# Patient Record
Sex: FEMALE | Race: WHITE | URBAN - METROPOLITAN AREA
[De-identification: names, ages, dates, MRNs, and addresses within clinical notes are randomized per-mention and may not be internally consistent; named-entity substitution may affect disease eponyms.]

---

## 2017-02-13 ENCOUNTER — OFFICE VISIT (OUTPATIENT)
Dept: OTOLARYNGOLOGY | Facility: CLINIC | Age: 57
End: 2017-02-13

## 2017-02-13 DIAGNOSIS — H92.11 OTORRHEA, RIGHT: Primary | ICD-10-CM

## 2017-02-13 RX ORDER — OMEGA-3-ACID ETHYL ESTERS 1 G/1
2 CAPSULE, LIQUID FILLED ORAL 2 TIMES DAILY
COMMUNITY

## 2017-02-13 ASSESSMENT — PAIN SCALES - GENERAL: PAINLEVEL: NO PAIN (0)

## 2017-02-13 NOTE — LETTER
2/13/2017      RE: Cailin Hidalgo  [address]  [facility] [address]       HPI: Cailin Hidalgo returns for recheck of her ears. She has a history of right chronic otorrhea. She was last seen in mid December with signs of right otomastoiditis. Culture at that time showed klebsiella and staph aureus. She reports finishing her course of antibiotics. Ear fullness is improved, she feels that hearing on that side is about the same. She denies any recent drainage since finishing the antibiotics    Physical examination:  female in no acute distress.  Alert and answering questions appropriately.  HB 1/6 bilaterally. Bilateral ears examined under the microscope. Left TM intact, thin and flaccid posterior inferiorly, normal landmarks, no sign of effusion. Right TM intact, mild irritation superiorly, small wet area along anterior surface of TM but much thinner with no granulation tissue, no suleman purulence.  Fraire lateralizes to right, Rinne equivocal    Assessment and plan:  56 y.o. Female with chronic otorrhea. Right TM significantly improved in appearance since last visit. Wet area on right TM cultured. Pt would benefit from PE tube once TM clear of all infection. She will return for recheck in 1 week once culture results are back.    Patient examined with ENT staff, Dr. Faby Mckenna.    Claudette Pena MD  Otolaryngology- Head and Neck Surgery PGY2     The patient was seen and examined by myself.  I agree with the resident's assessment and plan.    Faby Mckenna MD

## 2017-02-13 NOTE — PROGRESS NOTES
HPI: Cailin Hidalgo returns for recheck of her ears. She has a history of right chronic otorrhea. She was last seen in mid December with signs of right otomastoiditis. Culture at that time showed klebsiella and staph aureus. She reports finishing her course of antibiotics. Ear fullness is improved, she feels that hearing on that side is about the same. She denies any recent drainage since finishing the antibiotics    Physical examination:  female in no acute distress.  Alert and answering questions appropriately.  HB 1/6 bilaterally. Bilateral ears examined under the microscope. Left TM intact, thin and flaccid posterior inferiorly, normal landmarks, no sign of effusion. Right TM intact, mild irritation superiorly, small wet area along anterior surface of TM but much thinner with no granulation tissue, no suleman purulence.  Fraire lateralizes to right, Rinne equivocal    Assessment and plan:  56 y.o. Female with chronic otorrhea. Right TM significantly improved in appearance since last visit. Wet area on right TM cultured. Pt would benefit from PE tube once TM clear of all infection. She will return for recheck in 1 week once culture results are back.    Patient examined with ENT staff, Dr. aFby Mckenna.    Claudette Pena MD  Otolaryngology- Head and Neck Surgery PGY2     I, Faby Mckenna, saw this patient with the resident/fellow and agree with the resident s findings and plan of care as documented in the resident s/fellow s note. I was present for the entire procedure.    Faby Mckenna MD

## 2017-02-13 NOTE — Clinical Note
2/13/2017       RE: Cailin Hidalgo  [address]  [facility] [address]     Dear Colleague,    Thank you for referring your patient, Cailin Hidalgo, to the [facility] EAR NOSE AND THROAT at [facility]. Please see a copy of my visit note below.    HPI: Cailin Hidalgo returns for recheck of her ears. She has a history of right chronic otorrhea. She was last seen in mid December with signs of right otomastoiditis. Culture at that time showed klebsiella and staph aureus. She reports finishing her course of antibiotics. Ear fullness is improved, she feels that hearing on that side is about the same. She denies any recent drainage since finishing the antibiotics    Review of systems:  10 point review of systems completed and negative unless otherwise stated in HPI.     Physical examination:  female in no acute distress.  Alert and answering questions appropriately.  HB 1/6 bilaterally. PERRL, EOMI. Bilateral ears examined under the microscope. Left TM intact, thin and flaccid posterior inferiorly, normal landmarks, no sign of effusion. Right TM intact, mild irritation superiorly, small wet area along anterior surface of TM. No suleman purulence.  Fraire lateralizes to right, Rinne equivocal    Assessment and plan:  56 y.o. Female with chronic otorrhea. Right TM significantly improved in appearance since last visit. Wet area on right TM cultured. Pt would benefit from PE tube once TM clear of all infection. She will return for recheck in 1 week once culture results are back.    Patient examined with ENT staff, Dr. Faby Mckenna.    Claudette Pena MD  Otolaryngology- Head and Neck Surgery PGY2         Again, thank you for allowing me to participate in the care of your patient.      Sincerely,    Faby Mckenna MD

## 2017-02-13 NOTE — MR AVS SNAPSHOT
After Visit Summary   2/13/2017    Cailin Hidalgo    MRN: [id number]           Patient Information     Date Of Birth          1960        Visit Information        Provider Department      2/13/2017 12:00 PM Faby Mckenna MD M [facility] Ear Nose and Throat        Today's Diagnoses     Otorrhea, right    -  1      Care Instructions    You will need  to schedule a follow up appointment in one week.  You will be called with the results of the ear culture.   Please call our clinic for any questions,concerns,or worsening symptoms.      Clinic #[phone]       Option 3  for the triage nurse.        Follow-ups after your visit        Your next 10 appointments already scheduled     Mar 02, 2017  2:45 PM CST   (Arrive by 2:30 PM)   Return Visit with MD YONATHAN Ramirez [facility] Ear Nose and Throat ([facility])    [address] [place] [address] [phone]              Who to contact     Please call your clinic at [phone] to:    Ask questions about your health    Make or cancel appointments    Discuss your medicines    Learn about your test results    Speak to your doctor   If you have compliments or concerns about an experience at your clinic, or if you wish to file a complaint, please contact [facility] Patient Relations at [phone] or email us at [email or web address]         Additional Information About Your Visit        MyChart Information     [employer] gives you secure access to your electronic health record. If you see a primary care provider, you can also send messages to your care team and make appointments. If you have questions, please call your primary care clinic.  If you do not have a primary care provider, please call [phone] and they will assist you.      [employer] is an electronic gateway that provides easy, online access to your medical records. With [employer], you can request a clinic  appointment, read your test results, renew a prescription or communicate with your care team.     To access your existing account, please contact your [facility] or call [phone] for assistance.        Care EveryWhere ID     This is your Care EveryWhere ID. This could be used by other organizations to access your [place] medical records  [id number]         Blood Pressure from Last 3 Encounters:   10/16/14 120/78   10/09/14 154/90    Weight from Last 3 Encounters:   12/12/16 83.1 kg (183 lb 4.8 oz)   12/02/16 83.2 kg (183 lb 8 oz)   10/16/14 84.1 kg (185 lb 6.4 oz)              We Performed the Following     Ear Culture Aerobic Bacterial     Fungus Culture, non-blood          Today's Medication Changes          These changes are accurate as of: 2/13/17 12:36 PM.  If you have any questions, ask your nurse or doctor.               Stop taking these medicines if you haven't already. Please contact your care team if you have questions.     augmented betamethasone dipropionate 0.05 % ointment   Commonly known as:  DIPROLENE-AF   Stopped by:  Faby Mckenna MD                    Primary Care Provider Office Phone # Fax #    hSawn DANIELS Ana Rosa [phone]       [facility] [address] [place] [address]   [place] [address]        Thank you!     Thank you for choosing [facility] EAR NOSE AND THROAT  for your care. Our goal is always to provide you with excellent care. Hearing back from our patients is one way we can continue to improve our services. Please take a few minutes to complete the written survey that you may receive in the mail after your visit with us. Thank you!             Your Updated Medication List - Protect others around you: Learn how to safely use, store and throw away your medicines at www.disposemymeds.org.          This list is accurate as of: 2/13/17 12:36 PM.  Always use your most recent med list.                   Brand Name Dispense Instructions  for use    buPROPion 150 MG 12 hr tablet    ZYBAN     Take 150 mg by mouth 2 times daily Reported on 2/13/2017       buPROPion 150 MG 24 hr tablet    WELLBUTRIN XL         CITALOPRAM HYDROBROMIDE PO      Take 40 mg by mouth       * clobetasol 0.05 % cream    TEMOVATE    120 g    Apply to affected area twice daily as needed.  Do not apply to face.       * clobetasol 0.05 % cream    TEMOVATE    240 g    Apply on rash twice daily for 2 weeks, then 3 times weekly for 2 weeks, repeat cycle as needed. Side effects of steroid overuse including atrophy were discussed.       fluticasone 50 MCG/ACT spray    FLONASE     Spray 2 sprays into both nostrils daily       MULTIVITAMIN PO      Reported on 2/13/2017       naproxen 500 MG tablet    NAPROSYN    30 tablet    Take 1 tablet (500 mg) by mouth 2 times daily as needed for moderate pain       omega-3 acid ethyl esters 1 G capsule    Lovaza     Take 2 g by mouth 2 times daily       SIMVASTATIN PO      Take 10 mg by mouth       Sodium Chloride 0.65 % Soln      Reported on 2/13/2017       triamcinolone 0.1 % cream    KENALOG     Apply topically 2 times daily Reported on 2/13/2017       VITAMIN D (CHOLECALCIFEROL) PO      Take by mouth daily       * Notice:  This list has 2 medication(s) that are the same as other medications prescribed for you. Read the directions carefully, and ask your doctor or other care provider to review them with you.

## 2017-02-13 NOTE — PATIENT INSTRUCTIONS
You will need  to schedule a follow up appointment in one week.  You will be called with the results of the ear culture.   Please call our clinic for any questions,concerns,or worsening symptoms.      Clinic #[phone]       Option 3  for the triage nurse.

## 2017-02-15 ENCOUNTER — CARE COORDINATION (OUTPATIENT)
Dept: OTOLARYNGOLOGY | Facility: CLINIC | Age: 57
End: 2017-02-15

## 2017-02-15 DIAGNOSIS — H92.10 OTORRHEA: Primary | ICD-10-CM

## 2017-02-15 LAB
BACTERIA SPEC CULT: ABNORMAL
MICRO REPORT STATUS: ABNORMAL
SPECIMEN SOURCE: ABNORMAL

## 2017-02-15 RX ORDER — PREDNISOLONE SODIUM PHOSPHATE 10 MG/ML
SOLUTION/ DROPS OPHTHALMIC
Qty: 10 ML | Refills: 0 | Status: SHIPPED | OUTPATIENT
Start: 2017-02-15 | End: 2017-03-02

## 2017-02-15 NOTE — PROGRESS NOTES
Dr. Mckenna has reviewed the ear culture done 2-13-17, no growth. New Rx for pred forte sent to pharmacy.   Pt notified and understood.  Dr. Mckenna may use TCA when pt rtc next.

## 2017-03-02 ENCOUNTER — OFFICE VISIT (OUTPATIENT)
Dept: OTOLARYNGOLOGY | Facility: CLINIC | Age: 57
End: 2017-03-02

## 2017-03-02 VITALS — WEIGHT: 182 LBS | BODY MASS INDEX: 30.32 KG/M2 | HEIGHT: 65 IN

## 2017-03-02 DIAGNOSIS — H73.91 ABNORMAL TYMPANIC MEMBRANE, RIGHT: Primary | ICD-10-CM

## 2017-03-02 RX ORDER — PREDNISOLONE SODIUM PHOSPHATE 10 MG/ML
SOLUTION/ DROPS OPHTHALMIC
Qty: 10 ML | Refills: 0 | Status: SHIPPED | OUTPATIENT
Start: 2017-03-02

## 2017-03-02 ASSESSMENT — PAIN SCALES - GENERAL: PAINLEVEL: NO PAIN (0)

## 2017-03-02 NOTE — NURSING NOTE
PREPROCEDURE: left myringotomy  Yes Patient ID verified with 2 identifiers (name and  or MRN)  Yes: Procedure and site verified with patient/designee  Yes: Accurate consent documentation in medical record  Yes: Marking not required. Reason: Provider is in continuous attendance with the patient from consent through completion of procedure.     TIME OUT:  Yes: Time-Out performed immediately prior to starting procedure, including verbal and active participation of all team members addressing:  * Correct patient identity  * Confirmed that the correct side and site are marked  * An accurate procedure consent form  * Agreement on the procedure to be done  * Correct patient position  * Relevant images and results are properly labeled and appropriately displayed  * The need to administer antibiotics or fluids for irrigation purposes during the procedure as applicable  * Safety precations based on patient history or medication use    DURING PROCEDURE: Verification of correct person, site, and procedure occurs any time the responsibility for care of the patient is transferred to another member of the care team.

## 2017-03-02 NOTE — LETTER
3/2/2017      RE: Cailin Hidalgo  [address]  [facility] [address]       Cailin Hidalgo is seen for a right ear check.  She reports her ear has been dry and there has been no otalgia.  Her hearing remains down.    Physical examination:  female in no acute distress.  Alert and answering questions appropriately.  HB 1/6 bilaterally.  Right ear examined under the microscope.  Right TM has an area of mucosalization anteriorly with possibly a small perforation present.  The middle ear appears aerated.    Assessment and plan:  Improved exam with no further otorrhea.  She does have some mucosalization likely from the chronic otorrhea and I've asked her to use prednisolone drops daily to try to dry the area further.  I'd like to see her back in 4 weeks and we'll obtain an audiogram at that time.  Once the audiogram is done, she will look into obtaining a hearing aid locally as she does live some distance away.      Faby Mckenna MD

## 2017-03-02 NOTE — PATIENT INSTRUCTIONS
You will need  to schedule a follow up appointment in 4  weeks with an audio.  You can see your local audiologist for a hearing aid.  Use your ear drops once -- every other day  Keep the ear dry will bathing.     Please call our clinic for any questions,concerns,or worsening symptoms.      Clinic #[phone]       Option 3  for the triage nurse.

## 2017-03-02 NOTE — MR AVS SNAPSHOT
After Visit Summary   3/2/2017    Cailin Hidalgo    MRN: [id number]           Patient Information     Date Of Birth          1960        Visit Information        Provider Department      3/2/2017 2:45 PM Faby Mckenna MD [facility] Ear Nose and Throat        Today's Diagnoses     Ear pressure    -  1      Care Instructions    You will need  to schedule a follow up appointment in 4  weeks with an audio.  You can see your local audiologist for a hearing aid.  Use your ear drops once -- every other day  Keep the ear dry will bathing.     Please call our clinic for any questions,concerns,or worsening symptoms.      Clinic #[phone]       Option 3  for the triage nurse.        Follow-ups after your visit        Your next 10 appointments already scheduled     Apr 06, 2017  1:30 PM CDT   Walk In From ENT with Andrew Archer   [facility] ([facility])    [address] [place] [address] [phone]            Apr 06, 2017  2:30 PM CDT   (Arrive by 2:15 PM)   Return Visit with Faby Mckenna MD   [facility] Ear Nose and Throat ([facility])    [address] [place] [address] [phone]              Who to contact     Please call your clinic at [phone] to:    Ask questions about your health    Make or cancel appointments    Discuss your medicines    Learn about your test results    Speak to your doctor   If you have compliments or concerns about an experience at your clinic, or if you wish to file a complaint, please contact [facility] Patient Relations at [phone] or email us at [email or web address]         Additional Information About Your Visit        MyChart Information     [employer] gives you secure access to your electronic health record. If you see a primary care provider, you can also send messages to your care team and make  "appointments. If you have questions, please call your primary care clinic.  If you do not have a primary care provider, please call [phone] and they will assist you.      [employer] is an electronic gateway that provides easy, online access to your medical records. With [employer], you can request a clinic appointment, read your test results, renew a prescription or communicate with your care team.     To access your existing account, please contact your [facility] or call [phone] for assistance.        Care EveryWhere ID     This is your Care EveryWhere ID. This could be used by other organizations to access your [place] medical records  [id number]        Your Vitals Were     Height BMI (Body Mass Index)                1.66 m (5' 5.35\") 29.96 kg/m2           Blood Pressure from Last 3 Encounters:   10/16/14 120/78   10/09/14 154/90    Weight from Last 3 Encounters:   03/02/17 82.6 kg (182 lb)   12/12/16 83.1 kg (183 lb 4.8 oz)   12/02/16 83.2 kg (183 lb 8 oz)              Today, you had the following     No orders found for display         Today's Medication Changes          These changes are accurate as of: 3/2/17  3:21 PM.  If you have any questions, ask your nurse or doctor.               These medicines have changed or have updated prescriptions.        Dose/Directions    prednisoLONE sodium phosphate 1 % ophthalmic solution   Commonly known as:  INFLAMASE FORTE   This may have changed:  additional instructions   Used for:  Ear pressure   Changed by:  Faby Mckenna MD        3 drops every other day-   Quantity:  10 mL   Refills:  0            Where to get your medicines      [id number] - [place], [place] - [address] [place], [place] [address] [address] [place] [address]     Phone:  [phone]     prednisoLONE sodium phosphate 1 % ophthalmic solution                Primary Care Provider Office Phone # Fax #    Shawn Oseguera [phone] " [phone]       [facility] [address]   [place] [address]        Thank you!     Thank you for choosing [facility] EAR NOSE AND THROAT  for your care. Our goal is always to provide you with excellent care. Hearing back from our patients is one way we can continue to improve our services. Please take a few minutes to complete the written survey that you may receive in the mail after your visit with us. Thank you!             Your Updated Medication List - Protect others around you: Learn how to safely use, store and throw away your medicines at www.disposemymeds.org.          This list is accurate as of: 3/2/17  3:21 PM.  Always use your most recent med list.                   Brand Name Dispense Instructions for use    buPROPion 150 MG 12 hr tablet    ZYBAN     Take 150 mg by mouth 2 times daily Reported on 2/13/2017       buPROPion 150 MG 24 hr tablet    WELLBUTRIN XL         CITALOPRAM HYDROBROMIDE PO      Take 40 mg by mouth       * clobetasol 0.05 % cream    TEMOVATE    120 g    Apply to affected area twice daily as needed.  Do not apply to face.       * clobetasol 0.05 % cream    TEMOVATE    240 g    Apply on rash twice daily for 2 weeks, then 3 times weekly for 2 weeks, repeat cycle as needed. Side effects of steroid overuse including atrophy were discussed.       fluticasone 50 MCG/ACT spray    FLONASE     Spray 2 sprays into both nostrils daily       MULTIVITAMIN PO      Reported on 2/13/2017       naproxen 500 MG tablet    NAPROSYN    30 tablet    Take 1 tablet (500 mg) by mouth 2 times daily as needed for moderate pain       omega-3 acid ethyl esters 1 G capsule    Lovaza     Take 2 g by mouth 2 times daily       prednisoLONE sodium phosphate 1 % ophthalmic solution    INFLAMASE FORTE    10 mL    3 drops every other day-       SIMVASTATIN PO      Take 10 mg by mouth       Sodium Chloride 0.65 % Soln      Reported on 2/13/2017       triamcinolone 0.1 % cream    KENALOG     Apply  topically 2 times daily Reported on 2/13/2017       VITAMIN D (CHOLECALCIFEROL) PO      Take by mouth daily       * Notice:  This list has 2 medication(s) that are the same as other medications prescribed for you. Read the directions carefully, and ask your doctor or other care provider to review them with you.

## 2017-03-02 NOTE — Clinical Note
3/2/2017       RE: Cailin Hidalgo  [address]  [facility] [address]     Dear Colleague,    Thank you for referring your patient, Cailin Hidalgo, to the [facility] EAR NOSE AND THROAT at [facility]. Please see a copy of my visit note below.    No notes on file    Again, thank you for allowing me to participate in the care of your patient.      Sincerely,    Faby Mckenna MD

## 2017-03-13 LAB
FUNGUS SPEC CULT: NORMAL
MICRO REPORT STATUS: NORMAL
SPECIMEN SOURCE: NORMAL

## 2017-03-13 NOTE — PROGRESS NOTES
Cailin Hidalgo is seen for a right ear check.  She reports her ear has been dry and there has been no otalgia.  Her hearing remains down.    Physical examination:  female in no acute distress.  Alert and answering questions appropriately.  HB 1/6 bilaterally.  Right ear examined under the microscope.  Right TM has an area of mucosalization anteriorly with possibly a small perforation present.  The middle ear appears aerated.    Assessment and plan:  Improved exam with no further otorrhea.  She does have some mucosalization likely from the chronic otorrhea and I've asked her to use prednisolone drops daily to try to dry the area further.  I'd like to see her back in 4 weeks and we'll obtain an audiogram at that time.  Once the audiogram is done, she will look into obtaining a hearing aid locally as she does live some distance away.

## 2017-03-30 DIAGNOSIS — H91.90 HEARING LOSS: Primary | ICD-10-CM

## 2017-04-06 ENCOUNTER — OFFICE VISIT (OUTPATIENT)
Dept: OTOLARYNGOLOGY | Facility: CLINIC | Age: 57
End: 2017-04-06

## 2017-04-06 ENCOUNTER — OFFICE VISIT (OUTPATIENT)
Dept: AUDIOLOGY | Facility: CLINIC | Age: 57
End: 2017-04-06
Attending: OTOLARYNGOLOGY

## 2017-04-06 DIAGNOSIS — H90.3 SENSORINEURAL HEARING LOSS, ASYMMETRICAL: Primary | ICD-10-CM

## 2017-04-06 DIAGNOSIS — H91.93 HEARING LOSS, BILATERAL: Primary | ICD-10-CM

## 2017-04-06 ASSESSMENT — PAIN SCALES - GENERAL: PAINLEVEL: NO PAIN (0)

## 2017-04-06 NOTE — NURSING NOTE
Chief Complaint   Patient presents with     RECHECK     Return 1 month F/U      Pt states no pain today.    N Dom NICHOLS

## 2017-04-06 NOTE — PATIENT INSTRUCTIONS
You may see your local audiologist for hearing aid consultation.  Please schedule a follow up with Dr. Mckenna- 6 months after you have gotten the hearing aids.   Please call our clinic for any questions,concerns,or worsening symptoms.      Clinic #[phone]       Option 3  for the triage nurse.

## 2017-04-06 NOTE — MR AVS SNAPSHOT
After Visit Summary   4/6/2017    Cailin Hidalgo    MRN: [id number]           Patient Information     Date Of Birth          1960        Visit Information        Provider Department      4/6/2017 2:30 PM Faby Mckenna MD [facility] Ear Nose and Throat        Today's Diagnoses     Hearing loss, bilateral    -  1      Care Instructions      You may see your local audiologist for hearing aid consultation.  Please schedule a follow up with Dr. Mckenna- 6 months after you have gotten the hearing aids.   Please call our clinic for any questions,concerns,or worsening symptoms.      Clinic #[phone]       Option 3  for the triage nurse.        Follow-ups after your visit        Who to contact     Please call your clinic at [phone] to:    Ask questions about your health    Make or cancel appointments    Discuss your medicines    Learn about your test results    Speak to your doctor   If you have compliments or concerns about an experience at your clinic, or if you wish to file a complaint, please contact [facility] Patient Relations at [phone] or email us at [email or web address]         Additional Information About Your Visit        MyChart Information     [employer] gives you secure access to your electronic health record. If you see a primary care provider, you can also send messages to your care team and make appointments. If you have questions, please call your primary care clinic.  If you do not have a primary care provider, please call [phone] and they will assist you.      [employer] is an electronic gateway that provides easy, online access to your medical records. With [employer], you can request a clinic appointment, read your test results, renew a prescription or communicate with your care team.     To access your existing account, please contact your [facility] or call [phone] for assistance.        Care EveryWhere  ID     This is your Care EveryWhere ID. This could be used by other organizations to access your [place] medical records  [id number]         Blood Pressure from Last 3 Encounters:   10/16/14 120/78   10/09/14 154/90    Weight from Last 3 Encounters:   03/02/17 82.6 kg (182 lb)   12/12/16 83.1 kg (183 lb 4.8 oz)   12/02/16 83.2 kg (183 lb 8 oz)              We Performed the Following     BINOCULAR MICROSCOPY        Primary Care Provider Office Phone # Fax #    Shawn Oseguera [phone]       [facility] [address]   [facility] [address]        Thank you!     Thank you for choosing [facility] EAR [facility]SE AND TH[facility]AT  for your care. Our goal is always to provide you with excellent care. Hearing back from our patients is one way we can continue to improve our services. Please take a few minutes to complete the written survey that you may receive in the mail after your visit with us. Thank you!             Your Updated Medication List - Protect others around you: Learn how to safely use, store and throw away your medicines at www.disposemymeds.org.          This list is accurate as of: 4/6/17 11:59 PM.  Always use your most recent med list.                   Brand Name Dispense Instructions for use    buPROPion 150 MG 24 hr tablet    WELLBUTRIN XL         CITALOPRAM HYDROBROMIDE PO      Take 40 mg by mouth       * clobetasol 0.05 % cream    TEMOVATE    120 g    Apply to affected area twice daily as needed.  Do not apply to face.       * clobetasol 0.05 % cream    TEMOVATE    240 g    Apply on rash twice daily for 2 weeks, then 3 times weekly for 2 weeks, repeat cycle as needed. Side effects of steroid overuse including atrophy were discussed.       fluticasone 50 MCG/ACT spray    FLONASE     Spray 2 sprays into both nostrils daily       MULTIVITAMIN PO      Reported on 4/6/2017       omega-3 acid ethyl esters 1 G capsule    Lovaza     Take 2 g by mouth 2 times daily       prednisoLONE  sodium phosphate 1 % ophthalmic solution    INFLAMASE FORTE    10 mL    3 drops every other day-       SIMVASTATIN PO      Take 10 mg by mouth       triamcinolone 0.1 % cream    KENALOG     Apply topically 2 times daily Reported on 2/13/2017       VITAMIN D (CHOLECALCIFEROL) PO      Take by mouth daily       * Notice:  This list has 2 medication(s) that are the same as other medications prescribed for you. Read the directions carefully, and ask your doctor or other care provider to review them with you.

## 2017-04-06 NOTE — PROGRESS NOTES
AUDIOLOGY REPORT    SUMMARY: Audiology visit completed. See audiogram for results.      RECOMMENDATIONS: Follow-up with ENT.    Andrew Avendaño  Audiologist  [place] License  #[id number]

## 2017-04-06 NOTE — MR AVS SNAPSHOT
After Visit Summary   4/6/2017    Cailin Hidalgo    MRN: [id number]           Patient Information     Date Of Birth          1960        Visit Information        Provider Department      4/6/2017 1:30 PM Aditi Main [facility] Audiology        Today's Diagnoses     Sensorineural hearing loss, asymmetrical    -  1       Follow-ups after your visit        Who to contact     Please call your clinic at [phone] to:    Ask questions about your health    Make or cancel appointments    Discuss your medicines    Learn about your test results    Speak to your doctor   If you have compliments or concerns about an experience at your clinic, or if you wish to file a complaint, please contact [facility] Patient Relations at [phone] or email us at [email or web address]         Additional Information About Your Visit        MyChart Information     [employer] gives you secure access to your electronic health record. If you see a primary care provider, you can also send messages to your care team and make appointments. If you have questions, please call your primary care clinic.  If you do not have a primary care provider, please call [phone] and they will assist you.      [employer] is an electronic gateway that provides easy, online access to your medical records. With [employer], you can request a clinic appointment, read your test results, renew a prescription or communicate with your care team.     To access your existing account, please contact your [facility] or call [phone] for assistance.        Care EveryWhere ID     This is your Care EveryWhere ID. This could be used by other organizations to access your [facility] medical records  [id number]         Blood Pressure from Last 3 Encounters:   10/16/14 120/78   10/09/14 154/90    Weight from Last 3 Encounters:   03/02/17 82.6 kg (182 lb)   12/12/16 83.1 kg (183 lb  4.8 oz)   12/02/16 83.2 kg (183 lb 8 oz)              We Performed the Following     AUDIOGRAM/TYMPANOGRAM - INTERFACE     AUDIOLOGY ADULT REFERRAL     [facility] ([address])     Tympanometry (impedance - testing) (58115)        Primary Care Provider Office Phone # Fax #    Shawn Oseguera [phone]       [facility] [address]   [facility] [address]        Thank you!     Thank you for choosing [facility] AUDIOLOGY  for your care. Our goal is always to provide you with excellent care. Hearing back from our patients is one way we can continue to improve our services. Please take a few minutes to complete the written survey that you may receive in the mail after your visit with us. Thank you!             Your Updated Medication List - Protect others around you: Learn how to safely use, store and throw away your medicines at www.disposemymeds.org.          This list is accurate as of: 4/6/17  2:45 PM.  Always use your most recent med list.                   Brand Name Dispense Instructions for use    buPROPion 150 MG 24 hr tablet    WELLBUTRIN XL         CITALOPRAM HYDROBROMIDE PO      Take 40 mg by mouth       * clobetasol 0.05 % cream    TEMOVATE    120 g    Apply to affected area twice daily as needed.  Do not apply to face.       * clobetasol 0.05 % cream    TEMOVATE    240 g    Apply on rash twice daily for 2 weeks, then 3 times weekly for 2 weeks, repeat cycle as needed. Side effects of steroid overuse including atrophy were discussed.       fluticasone 50 MCG/ACT spray    FLONASE     Spray 2 sprays into both nostrils daily       MULTIVITAMIN PO      Reported on 4/6/2017       omega-3 acid ethyl esters 1 G capsule    Lovaza     Take 2 g by mouth 2 times daily       prednisoLONE sodium phosphate 1 % ophthalmic solution    INFLAMASE FORTE    10 mL    3 drops every other day-       SIMVASTATIN PO      Take 10 mg by mouth       triamcinolone 0.1 %  cream    KENALOG     Apply topically 2 times daily Reported on 2/13/2017       VITAMIN D (CHOLECALCIFEROL) PO      Take by mouth daily       * Notice:  This list has 2 medication(s) that are the same as other medications prescribed for you. Read the directions carefully, and ask your doctor or other care provider to review them with you.

## 2017-04-06 NOTE — LETTER
4/6/2017      RE: Cailin Hidalgo  [address]  [facility] [address]       Cailin Hidalgo is seen for a right ear check.  I had her use steroid drops for an area of mucosalization on the anterior TM.  She reports that her hearing overall is much better than it was when she was first seen with chronic otorrhea.  Her left TM was quite thick initially as well but did not appear infected.  Her initial culture on the right grew 2 strains of klebsiella and staph aureus and she was placed on oral antibiotics and drops.  Both ears have improved with no further otorrhea or otalgia on the right and improved hearing bilaterally.    Physical examination:  female in no acute distress.  Alert and answering questions appropriately.  HB 1/6 bilaterally.  Both ears examined under the microscope.  Left TM remains intact and thin and flaccid with no retraction or effusion noted.  Right TM is not as thick and the area of mucosalization has cleared, the middle ear appears clear.    Audiogram:  Right mild downsloping to profound sensorineural hearing loss with 96% speech discrimination, shallow tympanogram.  Left normal to mild downsloping to profound sensorineural hearing loss with 100% speech discrimination and a hypermobile tympanogram.  This is a significant improvement in both ears when compared to the moderate to severe downsloping to profound loss that was present before.    Assessment and plan:  Overall much improved exam bilaterally with improved hearing.  She was very pleased.  I recommended she pursue hearing aids bilaterally.  We did discuss that if she is prone to otitis externa, hearing aids could increase the risk due to the moisture trapping so I'd like to see her for a check if she does get hearing aids.    Faby Mckenna MD

## 2017-04-19 NOTE — PROGRESS NOTES
Cailin Hidalgo is seen for a right ear check.  I had her use steroid drops for an area of mucosalization on the anterior TM.  She reports that her hearing overall is much better than it was when she was first seen with chronic otorrhea.  Her left TM was quite thick initially as well but did not appear infected.  Her initial culture on the right grew 2 strains of klebsiella and staph aureus and she was placed on oral antibiotics and drops.  Both ears have improved with no further otorrhea or otalgia on the right and improved hearing bilaterally.    Physical examination:  female in no acute distress.  Alert and answering questions appropriately.  HB 1/6 bilaterally.  Both ears examined under the microscope.  Left TM remains intact and thin and flaccid with no retraction or effusion noted.  Right TM is not as thick and the area of mucosalization has cleared, the middle ear appears clear.    Audiogram:  Right mild downsloping to profound sensorineural hearing loss with 96% speech discrimination, shallow tympanogram.  Left normal to mild downsloping to profound sensorineural hearing loss with 100% speech discrimination and a hypermobile tympanogram.  This is a significant improvement in both ears when compared to the moderate to severe downsloping to profound loss that was present before.    Assessment and plan:  Overall much improved exam bilaterally with improved hearing.  She was very pleased.  I recommended she pursue hearing aids bilaterally.  We did discuss that if she is prone to otitis externa, hearing aids could increase the risk due to the moisture trapping so I'd like to see her for a check if she does get hearing aids.

## 2017-12-24 ENCOUNTER — HEALTH MAINTENANCE LETTER (OUTPATIENT)
Age: 57
End: 2017-12-24

## 2020-03-02 ENCOUNTER — HEALTH MAINTENANCE LETTER (OUTPATIENT)
Age: 60
End: 2020-03-02

## 2020-12-20 ENCOUNTER — HEALTH MAINTENANCE LETTER (OUTPATIENT)
Age: 60
End: 2020-12-20

## 2021-04-24 ENCOUNTER — HEALTH MAINTENANCE LETTER (OUTPATIENT)
Age: 61
End: 2021-04-24

## 2021-10-03 ENCOUNTER — HEALTH MAINTENANCE LETTER (OUTPATIENT)
Age: 61
End: 2021-10-03

## 2022-03-20 ENCOUNTER — HEALTH MAINTENANCE LETTER (OUTPATIENT)
Age: 62
End: 2022-03-20

## 2022-05-15 ENCOUNTER — HEALTH MAINTENANCE LETTER (OUTPATIENT)
Age: 62
End: 2022-05-15

## 2022-09-10 ENCOUNTER — HEALTH MAINTENANCE LETTER (OUTPATIENT)
Age: 62
End: 2022-09-10

## 2023-06-03 ENCOUNTER — HEALTH MAINTENANCE LETTER (OUTPATIENT)
Age: 63
End: 2023-06-03